# Patient Record
Sex: FEMALE | Race: WHITE
[De-identification: names, ages, dates, MRNs, and addresses within clinical notes are randomized per-mention and may not be internally consistent; named-entity substitution may affect disease eponyms.]

---

## 2020-08-03 ENCOUNTER — HOSPITAL ENCOUNTER (OUTPATIENT)
Dept: HOSPITAL 75 - WSO | Age: 20
Discharge: HOME | End: 2020-08-03
Attending: FAMILY MEDICINE
Payer: MEDICAID

## 2020-08-03 VITALS — SYSTOLIC BLOOD PRESSURE: 132 MMHG | DIASTOLIC BLOOD PRESSURE: 69 MMHG

## 2020-08-03 VITALS — WEIGHT: 201.06 LBS | HEIGHT: 61.81 IN | BODY MASS INDEX: 37 KG/M2

## 2020-08-03 DIAGNOSIS — O23.43: Primary | ICD-10-CM

## 2020-08-03 DIAGNOSIS — Z3A.39: ICD-10-CM

## 2020-08-03 LAB
APTT PPP: YELLOW S
BACTERIA #/AREA URNS HPF: (no result) /HPF
BILIRUB UR QL STRIP: NEGATIVE
FIBRINOGEN PPP-MCNC: (no result) MG/DL
GLUCOSE UR STRIP-MCNC: NEGATIVE MG/DL
KETONES UR QL STRIP: NEGATIVE
LEUKOCYTE ESTERASE UR QL STRIP: (no result)
NITRITE UR QL STRIP: NEGATIVE
PH UR STRIP: 7 [PH] (ref 5–9)
PROT UR QL STRIP: NEGATIVE
RBC #/AREA URNS HPF: (no result) /HPF
SP GR UR STRIP: 1.01 (ref 1.02–1.02)

## 2020-08-03 PROCEDURE — 99213 OFFICE O/P EST LOW 20 MIN: CPT

## 2020-08-03 PROCEDURE — 81000 URINALYSIS NONAUTO W/SCOPE: CPT

## 2020-08-03 PROCEDURE — 87088 URINE BACTERIA CULTURE: CPT

## 2020-08-03 NOTE — NUR
Arrived to unit ambulates self accompanied by mother.  Pt here for c/o contraction pain, 
leaking fluid and nausea.  wt obtained and to room 318.  Gowned and urine sample obtained.  
pt void, gowned and to bed.  plan of care reviewed with pt and mother.

## 2020-08-03 NOTE — NUR
Discharge instructions explained, signed and copy to patient.  pt verbalized understanding 
of instructions and denied questions.

## 2020-08-03 NOTE — DISCHARGE INST-SIMPLE/STANDARD
Discharge Inst-Standard


Reconcile Patient Problems


Problems Reviewed?:  Yes





Discharge Medications


New, Converted or Re-Newed RX:  RX Given to Pt/Family





Patient Instructions/Follow Up


Plan of Care/Instructions/FU:  


UTI in pregnancy: 5 day course of oral antibiotic Keflex, 250mg every 6


hours for 5 days


Activity as Tolerated:  Yes


Goal:  


Drink plenty of water, take tylenol as directed for headaches/pains, and


strict return precautions


Discharge Diet:  No Restrictions


Health Concerns:  


None


[facility]  


Return precautions in pregnancy











PREMA DOS SANTOS MD               Aug 3, 2020 17:33

## 2020-08-03 NOTE — NUR
Discharged to home with belongings in hand.  Ambulates self downstairs to private vehicle 
with belongings in hand.  Accompanied by mother.

## 2020-08-03 NOTE — OB TRIAGE REPORT
PREMA DOS SANTOS MD 8/3/20 1749:


Standard Progress Note


Progress Notes/Assess & Plan


Date Seen by a Provider:  Aug 3, 2020


Time Seen by a Provider:  17:00


Expected Date of Delivery:  Aug 9, 2020


Gestational Age in Weeks:  39


Gestational Age in Days:  1


LMP/KAITLIN Comment:  


KAITLIN by LMP 11/3/2019


Progress/Assessment & Plan





Assessment: 


Pregnancy, 39th week gestation


Positive NG/CT, KAY-


PUPPs


Rubella Equivocal


UTI





Natacha Escamilla is a 21 yo  female who presented to triage with UTI. 

Discharged home with oral antibiotics and strict return precautions. 





Plan:


Keflex 250mg q6h x 5 days


Tylenol 325mg q4h prn pain/headache


Urine sent for culture


Strict return precautions


Keep all OB appts


Plan for elective induction 2020 





HPI:





Natacha is a  at 39w1d were presented to OB triage for a sustained 

contraction that occurred this afternoon in the shower that was so severe she 

had to sit down. She notes it lasted about 4 minutes.  She admits to cramping 

like abdominal pain otherwise; she has not been counting contractions or fetal 

movements. She admits to losing her mucus plug last week, and stating she has 

bloody show with her clinic cervical checks.  She has been 3/0/-3 at her 

previous cervical checks. She states that she has had loss of fluid that is 

thick and yellow in color, small volume that is noticeable when she pees. She 

admits to rib tightness, headache that is intractable, continued pruritus. 

Denies vision changes, shortness of breath, chest pain/pressure, RUQ pain, and 

increased swelling. She has not tried any OTC medications for her headache 

and/or abdominal pain. 





VB-, LOF+ (see above), FM+, CTX+





Objective: 





General: No acute distress


HEENT: No focal deficits


CV: Regular rate, pulses palpable


Lungs: No resp distress


Abd: RUQ nonTTP


Ext: Nonpitting edema in BLE, symmetric





SVE: 350/-2


FHR: 135


Category: I, accels, no decels, prolonged variability


TOCO: 1/10, irritability





UA: 2+ Leukocytes, 20-50 WBC, moderate bacteria





O+, Ab -, HepB -, RE, RPR-, NG/CT+ (KAY -), GBS-, COVID unknown


Final Diagnosis


Final diagnosis: UTI in pregnancy





Diagnosis/Problems


Diagnosis/Problems





(1) UTI in pregnancy


Status:  Acute


Assessment & Plan:  Keflex 250mg q6h x 5days


Qualifiers:  


   Qualified Codes:  O23.43 - Unspecified infection of urinary tract in 

pregnancy, third trimester





SAFIA MCCRAY MD 8/3/20 9657:


Standard Progress Note


Final Diagnosis


Patient was reviewed with me over the phone and agree with above assessment and 

plan. PREMA Lea MD               Aug 3, 2020 17:49


SAFIA MCCRAY MD                Aug 3, 2020 19:07

## 2020-08-11 ENCOUNTER — HOSPITAL ENCOUNTER (INPATIENT)
Dept: HOSPITAL 75 - LDRP | Age: 20
LOS: 2 days | Discharge: HOME | End: 2020-08-13
Attending: FAMILY MEDICINE | Admitting: FAMILY MEDICINE
Payer: MEDICAID

## 2020-08-11 VITALS — DIASTOLIC BLOOD PRESSURE: 78 MMHG | SYSTOLIC BLOOD PRESSURE: 129 MMHG

## 2020-08-11 VITALS — DIASTOLIC BLOOD PRESSURE: 75 MMHG | SYSTOLIC BLOOD PRESSURE: 111 MMHG

## 2020-08-11 VITALS — DIASTOLIC BLOOD PRESSURE: 84 MMHG | SYSTOLIC BLOOD PRESSURE: 138 MMHG

## 2020-08-11 VITALS — SYSTOLIC BLOOD PRESSURE: 90 MMHG | DIASTOLIC BLOOD PRESSURE: 31 MMHG

## 2020-08-11 VITALS — DIASTOLIC BLOOD PRESSURE: 64 MMHG | SYSTOLIC BLOOD PRESSURE: 102 MMHG

## 2020-08-11 VITALS — DIASTOLIC BLOOD PRESSURE: 78 MMHG | SYSTOLIC BLOOD PRESSURE: 132 MMHG

## 2020-08-11 VITALS — WEIGHT: 210.32 LBS | BODY MASS INDEX: 38.22 KG/M2 | HEIGHT: 62.01 IN

## 2020-08-11 VITALS — SYSTOLIC BLOOD PRESSURE: 145 MMHG | DIASTOLIC BLOOD PRESSURE: 75 MMHG

## 2020-08-11 VITALS — DIASTOLIC BLOOD PRESSURE: 88 MMHG | SYSTOLIC BLOOD PRESSURE: 142 MMHG

## 2020-08-11 VITALS — SYSTOLIC BLOOD PRESSURE: 132 MMHG | DIASTOLIC BLOOD PRESSURE: 74 MMHG

## 2020-08-11 VITALS — SYSTOLIC BLOOD PRESSURE: 131 MMHG | DIASTOLIC BLOOD PRESSURE: 70 MMHG

## 2020-08-11 VITALS — DIASTOLIC BLOOD PRESSURE: 79 MMHG | SYSTOLIC BLOOD PRESSURE: 136 MMHG

## 2020-08-11 VITALS — SYSTOLIC BLOOD PRESSURE: 132 MMHG | DIASTOLIC BLOOD PRESSURE: 79 MMHG

## 2020-08-11 VITALS — DIASTOLIC BLOOD PRESSURE: 82 MMHG | SYSTOLIC BLOOD PRESSURE: 139 MMHG

## 2020-08-11 VITALS — DIASTOLIC BLOOD PRESSURE: 69 MMHG | SYSTOLIC BLOOD PRESSURE: 122 MMHG

## 2020-08-11 VITALS — SYSTOLIC BLOOD PRESSURE: 127 MMHG | DIASTOLIC BLOOD PRESSURE: 75 MMHG

## 2020-08-11 VITALS — SYSTOLIC BLOOD PRESSURE: 117 MMHG | DIASTOLIC BLOOD PRESSURE: 73 MMHG

## 2020-08-11 VITALS — DIASTOLIC BLOOD PRESSURE: 79 MMHG | SYSTOLIC BLOOD PRESSURE: 108 MMHG

## 2020-08-11 VITALS — DIASTOLIC BLOOD PRESSURE: 69 MMHG | SYSTOLIC BLOOD PRESSURE: 111 MMHG

## 2020-08-11 VITALS — SYSTOLIC BLOOD PRESSURE: 129 MMHG | DIASTOLIC BLOOD PRESSURE: 63 MMHG

## 2020-08-11 VITALS — DIASTOLIC BLOOD PRESSURE: 82 MMHG | SYSTOLIC BLOOD PRESSURE: 135 MMHG

## 2020-08-11 VITALS — DIASTOLIC BLOOD PRESSURE: 77 MMHG | SYSTOLIC BLOOD PRESSURE: 135 MMHG

## 2020-08-11 VITALS — SYSTOLIC BLOOD PRESSURE: 139 MMHG | DIASTOLIC BLOOD PRESSURE: 71 MMHG

## 2020-08-11 VITALS — DIASTOLIC BLOOD PRESSURE: 63 MMHG | SYSTOLIC BLOOD PRESSURE: 134 MMHG

## 2020-08-11 VITALS — DIASTOLIC BLOOD PRESSURE: 83 MMHG | SYSTOLIC BLOOD PRESSURE: 145 MMHG

## 2020-08-11 VITALS — DIASTOLIC BLOOD PRESSURE: 59 MMHG | SYSTOLIC BLOOD PRESSURE: 107 MMHG

## 2020-08-11 VITALS — DIASTOLIC BLOOD PRESSURE: 85 MMHG | SYSTOLIC BLOOD PRESSURE: 135 MMHG

## 2020-08-11 VITALS — SYSTOLIC BLOOD PRESSURE: 115 MMHG | DIASTOLIC BLOOD PRESSURE: 64 MMHG

## 2020-08-11 VITALS — SYSTOLIC BLOOD PRESSURE: 119 MMHG | DIASTOLIC BLOOD PRESSURE: 76 MMHG

## 2020-08-11 VITALS — SYSTOLIC BLOOD PRESSURE: 133 MMHG | DIASTOLIC BLOOD PRESSURE: 88 MMHG

## 2020-08-11 VITALS — DIASTOLIC BLOOD PRESSURE: 80 MMHG | SYSTOLIC BLOOD PRESSURE: 133 MMHG

## 2020-08-11 VITALS — SYSTOLIC BLOOD PRESSURE: 109 MMHG | DIASTOLIC BLOOD PRESSURE: 78 MMHG

## 2020-08-11 VITALS — SYSTOLIC BLOOD PRESSURE: 135 MMHG | DIASTOLIC BLOOD PRESSURE: 81 MMHG

## 2020-08-11 VITALS — DIASTOLIC BLOOD PRESSURE: 57 MMHG | SYSTOLIC BLOOD PRESSURE: 100 MMHG

## 2020-08-11 VITALS — DIASTOLIC BLOOD PRESSURE: 76 MMHG | SYSTOLIC BLOOD PRESSURE: 107 MMHG

## 2020-08-11 VITALS — SYSTOLIC BLOOD PRESSURE: 121 MMHG | DIASTOLIC BLOOD PRESSURE: 69 MMHG

## 2020-08-11 VITALS — DIASTOLIC BLOOD PRESSURE: 67 MMHG | SYSTOLIC BLOOD PRESSURE: 119 MMHG

## 2020-08-11 VITALS — SYSTOLIC BLOOD PRESSURE: 135 MMHG | DIASTOLIC BLOOD PRESSURE: 71 MMHG

## 2020-08-11 VITALS — DIASTOLIC BLOOD PRESSURE: 79 MMHG | SYSTOLIC BLOOD PRESSURE: 139 MMHG

## 2020-08-11 VITALS — SYSTOLIC BLOOD PRESSURE: 100 MMHG | DIASTOLIC BLOOD PRESSURE: 53 MMHG

## 2020-08-11 VITALS — DIASTOLIC BLOOD PRESSURE: 57 MMHG | SYSTOLIC BLOOD PRESSURE: 99 MMHG

## 2020-08-11 VITALS — DIASTOLIC BLOOD PRESSURE: 77 MMHG | SYSTOLIC BLOOD PRESSURE: 121 MMHG

## 2020-08-11 VITALS — SYSTOLIC BLOOD PRESSURE: 120 MMHG | DIASTOLIC BLOOD PRESSURE: 77 MMHG

## 2020-08-11 VITALS — SYSTOLIC BLOOD PRESSURE: 127 MMHG | DIASTOLIC BLOOD PRESSURE: 70 MMHG

## 2020-08-11 VITALS — SYSTOLIC BLOOD PRESSURE: 136 MMHG | DIASTOLIC BLOOD PRESSURE: 80 MMHG

## 2020-08-11 VITALS — SYSTOLIC BLOOD PRESSURE: 125 MMHG | DIASTOLIC BLOOD PRESSURE: 77 MMHG

## 2020-08-11 VITALS — SYSTOLIC BLOOD PRESSURE: 98 MMHG | DIASTOLIC BLOOD PRESSURE: 55 MMHG

## 2020-08-11 VITALS — DIASTOLIC BLOOD PRESSURE: 85 MMHG | SYSTOLIC BLOOD PRESSURE: 122 MMHG

## 2020-08-11 VITALS — SYSTOLIC BLOOD PRESSURE: 122 MMHG | DIASTOLIC BLOOD PRESSURE: 75 MMHG

## 2020-08-11 VITALS — SYSTOLIC BLOOD PRESSURE: 120 MMHG | DIASTOLIC BLOOD PRESSURE: 76 MMHG

## 2020-08-11 VITALS — SYSTOLIC BLOOD PRESSURE: 111 MMHG | DIASTOLIC BLOOD PRESSURE: 72 MMHG

## 2020-08-11 VITALS — SYSTOLIC BLOOD PRESSURE: 141 MMHG | DIASTOLIC BLOOD PRESSURE: 71 MMHG

## 2020-08-11 VITALS — SYSTOLIC BLOOD PRESSURE: 126 MMHG | DIASTOLIC BLOOD PRESSURE: 84 MMHG

## 2020-08-11 VITALS — SYSTOLIC BLOOD PRESSURE: 126 MMHG | DIASTOLIC BLOOD PRESSURE: 74 MMHG

## 2020-08-11 VITALS — DIASTOLIC BLOOD PRESSURE: 87 MMHG | SYSTOLIC BLOOD PRESSURE: 137 MMHG

## 2020-08-11 VITALS — SYSTOLIC BLOOD PRESSURE: 132 MMHG | DIASTOLIC BLOOD PRESSURE: 78 MMHG

## 2020-08-11 VITALS — SYSTOLIC BLOOD PRESSURE: 101 MMHG | DIASTOLIC BLOOD PRESSURE: 55 MMHG

## 2020-08-11 VITALS — SYSTOLIC BLOOD PRESSURE: 127 MMHG | DIASTOLIC BLOOD PRESSURE: 78 MMHG

## 2020-08-11 VITALS — SYSTOLIC BLOOD PRESSURE: 100 MMHG | DIASTOLIC BLOOD PRESSURE: 61 MMHG

## 2020-08-11 VITALS — SYSTOLIC BLOOD PRESSURE: 110 MMHG | DIASTOLIC BLOOD PRESSURE: 72 MMHG

## 2020-08-11 VITALS — DIASTOLIC BLOOD PRESSURE: 64 MMHG | SYSTOLIC BLOOD PRESSURE: 113 MMHG

## 2020-08-11 DIAGNOSIS — O99.613: ICD-10-CM

## 2020-08-11 DIAGNOSIS — K21.9: ICD-10-CM

## 2020-08-11 DIAGNOSIS — Z23: ICD-10-CM

## 2020-08-11 DIAGNOSIS — Z3A.40: ICD-10-CM

## 2020-08-11 DIAGNOSIS — D62: ICD-10-CM

## 2020-08-11 DIAGNOSIS — Z87.891: ICD-10-CM

## 2020-08-11 DIAGNOSIS — O48.0: Primary | ICD-10-CM

## 2020-08-11 LAB
BASOPHILS # BLD AUTO: 0 10^3/UL (ref 0–0.1)
BASOPHILS NFR BLD AUTO: 0 % (ref 0–10)
EOSINOPHIL # BLD AUTO: 0.3 10^3/UL (ref 0–0.3)
EOSINOPHIL NFR BLD AUTO: 3 % (ref 0–10)
ERYTHROCYTE [DISTWIDTH] IN BLOOD BY AUTOMATED COUNT: 14.1 % (ref 10–14.5)
HCT VFR BLD CALC: 34 % (ref 35–52)
HGB BLD-MCNC: 11.7 G/DL (ref 11.5–16)
LYMPHOCYTES # BLD AUTO: 2.5 X 10^3 (ref 1–4)
LYMPHOCYTES NFR BLD AUTO: 23 % (ref 12–44)
MANUAL DIFFERENTIAL PERFORMED BLD QL: NO
MCH RBC QN AUTO: 29 PG (ref 25–34)
MCHC RBC AUTO-ENTMCNC: 34 G/DL (ref 32–36)
MCV RBC AUTO: 85 FL (ref 80–99)
MONOCYTES # BLD AUTO: 1 X 10^3 (ref 0–1)
MONOCYTES NFR BLD AUTO: 9 % (ref 0–12)
NEUTROPHILS # BLD AUTO: 7.1 X 10^3 (ref 1.8–7.8)
NEUTROPHILS NFR BLD AUTO: 65 % (ref 42–75)
PLATELET # BLD: 210 10^3/UL (ref 130–400)
PMV BLD AUTO: 11.3 FL (ref 7.4–10.4)
WBC # BLD AUTO: 10.9 10^3/UL (ref 4.3–11)

## 2020-08-11 PROCEDURE — 85025 COMPLETE CBC W/AUTO DIFF WBC: CPT

## 2020-08-11 PROCEDURE — 86850 RBC ANTIBODY SCREEN: CPT

## 2020-08-11 PROCEDURE — 86900 BLOOD TYPING SEROLOGIC ABO: CPT

## 2020-08-11 PROCEDURE — 3E033VJ INTRODUCTION OF OTHER HORMONE INTO PERIPHERAL VEIN, PERCUTANEOUS APPROACH: ICD-10-PCS | Performed by: OBSTETRICS & GYNECOLOGY

## 2020-08-11 PROCEDURE — 86901 BLOOD TYPING SEROLOGIC RH(D): CPT

## 2020-08-11 PROCEDURE — 90707 MMR VACCINE SC: CPT

## 2020-08-11 PROCEDURE — 36415 COLL VENOUS BLD VENIPUNCTURE: CPT

## 2020-08-11 RX ADMIN — SODIUM CHLORIDE, SODIUM LACTATE, POTASSIUM CHLORIDE, CALCIUM CHLORIDE, AND DEXTROSE MONOHYDRATE SCH MLS/HR: 600; 310; 30; 20; 5 INJECTION, SOLUTION INTRAVENOUS at 06:57

## 2020-08-11 RX ADMIN — Medication SCH ML: at 07:39

## 2020-08-11 RX ADMIN — Medication SCH ML/HR: at 19:17

## 2020-08-11 RX ADMIN — KETOROLAC TROMETHAMINE SCH MG: 30 INJECTION, SOLUTION INTRAMUSCULAR; INTRAVENOUS at 22:11

## 2020-08-11 RX ADMIN — Medication SCH ML/HR: at 11:35

## 2020-08-11 RX ADMIN — Medication SCH ML: at 15:04

## 2020-08-11 RX ADMIN — SODIUM CHLORIDE, SODIUM LACTATE, POTASSIUM CHLORIDE, CALCIUM CHLORIDE, AND DEXTROSE MONOHYDRATE SCH MLS/HR: 600; 310; 30; 20; 5 INJECTION, SOLUTION INTRAVENOUS at 14:12

## 2020-08-11 RX ADMIN — Medication SCH ML: at 22:20

## 2020-08-11 NOTE — NUR
Dr. Hernandez here for epidural placement. Procedure explained, consent reviewed and 
signed by anesthesia.  Questions answered to patient's satisfaction. Time out taken to 
verify correct patient/procedure. Patient up to side of bed, assisted into sitting position. 
 Betadine prep done x3 and sterile drape applied.  Local done, see anesthesia record.  Test 
dose given, see anesthesia record for drug and dosage.  Epidural catheter secured in place.  
Epidural placement complete.  Assisted back into bed, monitors adjusted.  Epidural dosed, 
see anesthesia record.  Epidural of Fentanyl/Bupivicaine @ 12 cc/hr stated per pump.  
Patient tolerated procedure well.

## 2020-08-11 NOTE — XMS REPORT
Continuity of Care Document

                             Created on: 2020



Natacha Escamilla

External Reference #: [id number]

: 2000

Sex: Female



Demographics





                          Address                   [address] [place]  [address]

 

                          Home Phone                [phone] x

 

                          Preferred Language        Unknown

 

                          Marital Status            Unknown

 

                          Protestant Affiliation     Unknown

 

                          Race                      Unknown

 

                          Ethnic Group              Unknown





Author





                          Organization              Unknown

 

                          Address                   Unknown

 

                          Phone                     Unavailable



              



Allergies

      



             Active           Description           Code           Type         

  Severity   

                Reaction           Onset           Reported/Identified          

 

Relationship to Patient                 Clinical Status        

 

                Yes             No Known Drug Allergies           [id number]    

       Drug 

Allergy           Unknown           N/A                             2020  

      

                                                             



                  



Medications

      



There is no data.                  



Problems

      



             Date Dx Coded           Attending           Type           Code    

       

Diagnosis                               Diagnosed By        

 

             2014           TWYLA ANDERSON                        311  

         

DEPRESSIVE DISORDER NOT ELSEWHERE CLASSIFIED                    

 

             2020           SHAZIA SANTIAGO, SAFIA PORTILLO Ot           O23.4

3           

UNSP INFCT OF URINARY TRACT IN PREGNANCY                    

 

             2020           SAFIA MCCRAY MD, Ot           Z3A.3

9           

39 WEEKS GESTATION OF PREGNANCY                    



                      



Procedures

      



                Code            Description           Performed By           Per

formed On        

 

                                      87999                                 PSYC

H DIAGNOSTIC EVALUATION   

                                                    2014        



                  



Results

      



                    Test                Result              Range        

 

                                        CULTURE, URINE - 20 19:00         

 

                    CULTURE, URINE, ROUTINE           SEE NOTE            NRG   

     

 

                                        CULTURE, GENITAL - 20 19:00       

  

 

                    CULTURE, GENITAL           SEE NOTE            NRG        

 

                                        RUBELLA IMMUNE STATUS - 20 13:27  

       

 

                    RUBELLA ANTIBODY (IGG)           0.95 index           NRG   

     

 

                                        CULTURE, GENITAL - 20 16:46       

  

 

                    CULTURE, GENITAL           SEE NOTE            NRG        

 

                                        PENTA SCREEN - 20 10:28         

 

                    Maternal Weight           170 lbs             NRG        

 

                    Est'd Date of Delivery           2020            NRG  

      

 

                    KAITLIN Determined by           LMP                 NRG        

 

                    Mother's Ethnic Origin                       NRG    

    

 

                    Number of Fetuses           1                   NRG        

 

                    Insulin Depend Diabetic           NO                  NRG   

     

 

                    Repeat Specimen           NO                  NRG        

 

                    Hx Of Neural Tube Defects           NO                  NRG 

       

 

                    Prev Pregnancy Down Synd           NO                  NRG  

      

 

                    Donor Egg           NO                  NRG        

 

                    Donor Age: Egg Retrieval           NOT GIVEN            NRG 

       

 

                    Cigarette smoker           NOT GIVEN            NRG        

 

                    INTERPRETATION:           SEE NOTE            NRG        

 

                    Risk for ONTD           <1:5000             NRG        

 

                    Age Risk Down Syndrome           1:1178              NRG    

    

 

                    JUAN Down Syndrome Risk           <1:5000             <1:270 

       

 

                    JUAN Trisomy 18 Risk           <1:5000             <1:100    

    

 

                    Calc'd Gestational Age           20.4                NRG    

    

 

                    AFP, Serum           44.2 ng/mL           NRG        

 

                    AFP MoM             0.81                NRG        

 

                    hCG, Serum           7.0 IU/mL           NRG        

 

                    hCG MoM             0.39                NRG        

 

                    Estriol, Free           1.74 ng/mL           NRG        

 

                    Estriol MoM           0.86                NRG        

 

                    Inhibin A, Dimeric           70 pg/mL            NRG        

 

                    Inhibin A MoM           0.43                NRG        

 

                    h-hCG, Serum           5.7 mcg/L           NRG        

 

                    h-hCG MoM           0.56                NRG        

 

                    Date of Birth           2000            NRG        

 

                    Collection Date           2020            NRG        

 

                                        GLUCOSE JEREMY 1 HOUR - 20 09:48     

    

 

                    GLUCOSE, POSTPRANDIAL/ 1 HOUR           131 mg/dL           

See Note:        

 

                                        CBC - 20 09:48         

 

                    WHITE BLOOD CELL COUNT           11.3 Thousand/uL           

3.8-10.8        

 

                    RED BLOOD CELL COUNT           4.03 Million/uL           3.8

0-5.10        

 

                    HEMOGLOBIN           12.0 g/dL           11.7-15.5        

 

                    HEMATOCRIT           36.1 %              35.0-45.0        

 

                    MCV                 89.6 fL             80.0-100.0        

 

                    MCH                 29.8 pg             27.0-33.0        

 

                    MCHC                33.2 g/dL           32.0-36.0        

 

                    RDW                 12.5 %              11.0-15.0        

 

                    PLATELET COUNT           228 Thousand/uL           140-400  

      

 

                    MPV                 11.2 fL             7.5-12.5        

 

                    ABSOLUTE NEUTROPHILS           8407 cells/uL           1500-

7800        

 

                    ABSOLUTE LYMPHOCYTES           2305 cells/uL           850-3

900        

 

                    ABSOLUTE MONOCYTES           475 cells/uL           200-950 

       

 

                    ABSOLUTE EOSINOPHILS           79 cells/uL            

       

 

                    ABSOLUTE BASOPHILS           34 cells/uL           0-200    

    

 

                    NEUTROPHILS           74.4 %              NRG        

 

                    LYMPHOCYTES           20.4 %              NRG        

 

                    MONOCYTES           4.2 %               NRG        

 

                    EOSINOPHILS           0.7 %               NRG        

 

                    BASOPHILS           0.3 %               NRG        

 

                                        SYPHILIS (RPR W/ REFLEX CONFIRMATION) - 

20 09:48         

 

                          RPR (DX) W/REFL TITER AND CONFIRMATORY TESTING        

   NON-REACTIVE           

                                        NON-REACTIVE        

 

                                        GLUCOSE JEREMY 3 HOUR - 20 13:49     

    

 

                    TIME 1              1038                NRG        

 

                    SPECIMEN 1           76 mg/dL            65-99        

 

                    TIME 2              1145                NRG        

 

                    SPECIMEN 2           135 mg/dL           NRG        

 

                    TIME 3              1245                NRG        

 

                    SPECIMEN 3           145 mg/dL           NRG        

 

                    TIME 4              1345                NRG        

 

                    SPECIMEN 4           135 mg/dL           NRG        

 

                    COMMENT                                 NRG        

 

                                        GLUCOSE JEREMY 3 HOUR - 20 14:21     

    

 

                    TIME 1              FASTING             NRG        

 

                    SPECIMEN 1           81 mg/dL            65-99        

 

                    TIME 2              1 HOUR              NRG        

 

                    SPECIMEN 2           150 mg/dL           NRG        

 

                    TIME 3              2 HOURS             NRG        

 

                    SPECIMEN 3           113 mg/dL           NRG        

 

                    TIME 4              3 HOURS             NRG        

 

                    SPECIMEN 4           120 mg/dL           NRG        

 

                    COMMENT                                 NRG        

 

                                        BILE ACIDS, FRACTIONATED LCMS - 20

 10:42         

 

                    CHOLIC ACID           0.6 umol/L           < OR = 1.8       

 

 

                    DEOXYCHOLIC ACID           <0.5 umol/L           < OR = 2.4 

       

 

                    CHENODEOXYCHOLIC ACID           <0.5 umol/L           < OR =

 3.1        

 

                    TOTAL BILE ACIDS           <1.5 umol/L           < OR = 6.8 

       

 

                                        CULTURE, GROUP B STREP (VAGINAL) - 07/15

/20 10:42         

 

                    STREPTOCOCCUS, GROUP B CULTURE           SEE NOTE           

 NRG        

 

                                        Complete urinalysis with reflex to cultu

re - 20 16:30         

 

                    Urine color determination           YELLOW              NRG 

       

 

                    Urine clarity determination           SL CLOUDY            N

RG        

 

                    Urine pH measurement by test strip           7.0            

     5-9        

 

                    Specific gravity of urine by test strip           1.010     

          1.016-1.022  

     

 

                          Urine protein assay by test strip, semi-quantitative  

         NEGATIVE         

                                        NEGATIVE        

 

                    Urine glucose detection by automated test strip           NE

GATIVE            

NEGATIVE        

 

                          Erythrocytes detection in urine sediment by light micr

oscopy           NEGATIVE 

                                        NEGATIVE        

 

                    Urine ketones detection by automated test strip           NE

GATIVE            

NEGATIVE        

 

                    Urine nitrite detection by test strip           NEGATIVE    

        NEGATIVE    

   

 

                    Urine total bilirubin detection by test strip           NEGA

TIVE            

NEGATIVE        

 

                          Urine urobilinogen measurement by automated test strip

 (mass/volume)           

0.2 mg/dL                               < = 1.0        

 

                    Urine leukocyte esterase detection by dipstick           2+ 

                 NEGATIVE 

      

 

                                        Automated urine sediment erythrocyte cou

nt by microscopy (number/high power 

field)                    NONE                      NRG        

 

                                        Automated urine sediment leukocyte count

 by microscopy (number/high power field)

                           [HPF]                    NRG        

 

                          Bacteria detection in urine sediment by light microsco

py           MODERATE     

                                        NRG        

 

                                        Squamous epithelial cells detection in u

rine sediment by light microscopy       

                          10-25                     NRG        

 

                          Crystals detection in urine sediment by light microsco

py           NONE         

                                        NRG        

 

                    Casts detection in urine sediment by light microscopy       

    NONE                

NRG        

 

                          Mucus detection in urine sediment by light microscopy 

          NEGATIVE        

                                        NRG        

 

                    Complete urinalysis with reflex to culture           YES    

             NRG        

 

                                        Bacterial urine culture - 20 16:30

         

 

                    Bacterial urine culture           3 OR MORE            NRG  

      

 

                    COLONY COUNT           >100,000/ML            NRG        

 

                    SUSCEPTIBILITY           GRAM POSITIVES, SUGGESTING PROBABLE

            NRG     

   

 

                    MRSA SCREEN           COLLECTION CONTAMINATION WITH SKIN ANNAMARIE

RA            NRG   

     

 

                    RAPID ID            NO SUSCEPTIBILITY PERFORMED            N

RG        



                                          



Encounters

      



                ACCT No.           Visit Date/Time           Discharge          

 Status         

             Pt. Type           Provider           Facility           Loc./Unit 

          

Complaint        

 

                [id number]           2020 15:15:00           2020 23:59:

59           CLS

                Outpatient           JAIMIE SANTIAGO, LESLI TOWNSEND                        

   [facility]

 [facility]                                  

 

             [id number]           07/15/2020 09:00:00                              

       Document

 Registration                                                                   

 

 

             [id number]           2020 09:20:00                              

       Document

 Registration                                                                   

 

 

             [id number]           2020 10:40:00                              

       Document

 Registration                                                                   

 

 

             [id number]           2020 10:20:00                              

       Document

 Registration                                                                   

 

 

             [id number]           2020 08:20:00                              

       Document

 Registration                                                                   

 

 

             [id number]           2020 13:00:00                              

       Document

 Registration                                                                   

 

 

             [id number]           2020 14:20:00                              

       Document

 Registration                                                                   

 

 

             [id number]           2020 13:20:00                              

       Document

 Registration                                                                   

 

 

             [id number]           2020 14:00:00                              

       Document

 Registration                                                                   

 

 

                [id number]           2014 13:12:00           2014 23:59:5

9           CLS 

                Outpatient           TWYLA ANDERSON                          

          

                                                 

 

                    [id number]           2020 16:11:00           

020 17:50:00        

                DIS             Outpatient           SHAZIA SANTIAGO, SAFIA Pollock [facility]           [facility]                       LEAKING,CONTRACTIONS   

     

 

             [id number]           2020 06:00:00                        P

NARCISO           

Preadike ETIENNE MD, LESLI IRIZARRY

## 2020-08-11 NOTE — LABOR PROGRESS NOTE
Labor Progress Note


Labor Progress Note


Date Seen by Provider:  Aug 11, 2020


Time Seen by Provider:  20:15


Subjective:


Pt denies complaints.





Objective:





Cervical exam: 80/-2


Consistency: soft


Position: anterior


Presentation: vertex


Fetal heart tones: 180 beats per minute, moderate variability


Tocometer: 5 ctx/10 minutes





Assessment/Plan:


Natacha Escamilla  is a 20  /Para  2 / 0,Gestational Age 40w2d here for IOL.


FSE/TOCO


Anesthesia: epidural


No cervical change in 4 hours in spite of adequate contractions with pitocin and

SROM this morning, increasing fetal caput without gain of station, suspect 

OP/CPD, now with fetal tachycardia, discussed with patient and recommend 

proceeding to . She is in agreement. Discussed with Dr. Drew on call 

for Ob/Gyn who is also in agreement.





Vitals - Labs


Vital Signs - I&O





Vital Signs








  Date Time  Temp Pulse Resp B/P (MAP) Pulse Ox O2 Delivery O2 Flow Rate FiO2


 


20 20:00  87 18 111/75 (87) 99   


 


20 19:40  96 18 129/63 (85) 99   


 


20 19:20 36.5 97 20 132/74 (93) 98   


 


20 19:00  97 18 120/76 (91) 99 Room Air  


 


20 18:45  97 18 132/78 (96) 99 Room Air  


 


20 18:30 36.3 97 18 132/78 (96) 98 Room Air  


 


20 18:15  106 18 107/59 (75) 98 Room Air  


 


20 18:00  101 18 98/55 (69) 99 Room Air  


 


20 17:45  100 18 100/53 (69) 99 Room Air  


 


20 17:30 36.6 95 18 110/72 (85) 97 Room Air  


 


20 17:15  86 18 100/57 (71) 96 Room Air  


 


20 17:00  86 18 99/57 (71) 97 Room Air  


 


20 16:45  78 18 101/55 (70) 97 Room Air  


 


20 16:30 36.4 86 18 115/64 (81) 96 Room Air  


 


20 16:15  86 18 111/69 (83) 97 Room Air  


 


20 16:00 36.1 83 18 111/72 (85) 97 Room Air  


 


20 15:45  96 18 122/85 (97) 98 Room Air  


 


20 15:30 35.7 100 18 117/73 (88) 98 Room Air  


 


20 15:15  96 18 125/77 (93) 97 Room Air  


 


20 15:00  93 18 127/70 (89) 98 Room Air  


 


20 14:45  95 18 121/69 (86) 97 Room Air  


 


20 14:30  99 18 122/69 (86) 98 Room Air  


 


20 14:15  127 18 135/71 (92) 98 Room Air  


 


20 14:00  100 18 131/70 (90) 98 Room Air  


 


20 13:45  102 18 134/63 (86) 98 Room Air  


 


20 13:30  98 18 133/88 (103) 98 Room Air  


 


20 13:15 36.0 94 18 122/75 (91) 98 Room Air  


 


20 13:00  83 18 126/74 (91) 98 Room Air  


 


20 12:45  87 18 129/78 (95) 98 Room Air  


 


20 12:30 36.3 93 18 135/77 (96) 98 Room Air  


 


20 12:15  85 18 132/74 (93) 97 Room Air  


 


20 12:00  85 18 141/71 (94) 97 Room Air  


 


20 11:45  107 18 145/75 (98) 97 Room Air  


 


20 11:33  97 18 139/71 (93) 97 Room Air  


 


20 11:30  97 18 138/84 (102) 98 Room Air  


 


20 11:27  94 18 137/87 (104) 97 Room Air  


 


20 11:26  104 18 136/80 (98) 98 Room Air  


 


20 11:18  100 18 142/88 (106) 98 Room Air  


 


20 11:16  100 18 136/79 (98) 98 Room Air  


 


20 11:15  101 18 136/79 (98)  Room Air  


 


20 11:00  95 18 135/82 (99)  Room Air  


 


20 10:45 36.3 93 18 126/84 (98)  Room Air  


 


20 10:30  79 18 119/67 (84)  Room Air  


 


20 10:15  84 18 139/82 (101)  Room Air  


 


20 10:00  88 18 132/79 (96)  Room Air  


 


20 09:45  83 18 127/75 (92)  Room Air  


 


20 09:30  83 18 139/79 (99)  Room Air  


 


20 09:15   18   Room Air  


 


20 09:00   18   Room Air  


 


20 08:45  90 18 119/76 (90)  Room Air  


 


20 08:30  90 18 120/77 (91)  Room Air  


 


20 08:15  98 18 135/85 (102)  Room Air  


 


20 08:00  83 18 127/78 (94)  Room Air  


 


20 07:45  82 18 133/80 (97)  Room Air  


 


20 07:10 36.3       


 


20 06:21 36.0 94 18  97 Room Air  











Labs


Laboratory Tests


20 07:44: 


White Blood Count 10.9, Red Blood Count 4.03L, Hemoglobin 11.7, Hematocrit 34L, 

Mean Corpuscular Volume 85, Mean Corpuscular Hemoglobin 29, Mean Corpuscular 

Hemoglobin Concent 34, Red Cell Distribution Width 14.1, Platelet Count 210, 

Mean Platelet Volume 11.3H, Neutrophils (%) (Auto) 65, Lymphocytes (%) (Auto) 

23, Monocytes (%) (Auto) 9, Eosinophils (%) (Auto) 3, Basophils (%) (Auto) 0, 

Neutrophils # (Auto) 7.1, Lymphocytes # (Auto) 2.5, Monocytes # (Auto) 1.0, 

Eosinophils # (Auto) 0.3, Basophils # (Auto) 0.0











LESLI ETIENNE MD             Aug 11, 2020 20:31

## 2020-08-11 NOTE — NUR
TONY STOVER presented to unit via ambulation  from home/ED, accompanied by ed tech & SO, 
for INDUCTION. TONY STOVER weighed, gowned, voided, and to bed.  EFHM and TOCO applied, 
VS taken.  TONY STOVER oriented to bed controls, call light, TV, heat, and A/C controls.

## 2020-08-11 NOTE — HISTORY & PHYSICAL-OB
PREMA HUNTER MD 20 0737:


OB - Chief Complaint & HPI


Date/Time


Date of Admission:


Date of Admission:  Aug 11, 2020 at 05:31


Date seen by a Provider:  Aug 11, 2020


Time Seen by a Provider:  07:15





Chief Complaint/History


OB-Reason for Admission/Chief:  Induction of Labor


Hx :  2


Hx Para:  0


Hx Last Menstrual Period:  11/3/2019


Expected Date of Delivery:  Aug 9, 2020


Gestational Age in Weeks:  40


Gestational Age in Days:  2


Indication for induction:  post dates


Admission Nurse Assessment Rev:  Yes





Allergies and Home Medications


Allergies


Coded Allergies:  


     No Known Drug Allergies (Unverified , 20)





Home Medications


Acetaminophen 325 Mg Capsule, 325 MG PO Q4H PRN for PAIN-MILD (1-4)


   Prescribed by: PREMA HUNTER on 8/3/20 1728


Mgd275/FA/Omega3/Dha/Fish Oil 1 Each Tab.chew, 2 EACH PO DAILY, (Reported)





Patient Home Medication List


Home Medication List Reviewed:  Yes





OB - History


Hx of Present Pregnancy


Prenatal Care:  Yes


Ultrasounds:  Normal mid trimester US


Obstetrical Complications:  Other (PUPPS)


Medical Complications:  None





Prenatal Information


Pregnancy Induced Hypertension:  No


Maternal Gestational Diabetes:  No


Postpartum Hemorrhage:  No





Obstetrical History


Hx :  2


Hx Para:  0


Hx # Term Pregnancies:  0


Hx #  Pregnancies:  0


Number of Living Children:  0


Hx Total # of Abortions (Spona:  1


Hx Multiple Gestation:  No


Hx Ectopic Pregnancy:  No


Hx Stillbirth:  No


Hx Pregnancy Complication:  No


Hx Pregnancy Induced Hypertens:  No


Hx Maternal Gestational Diabet:  No


Hx Postpartum Hemorrhage:  No





Patient Past Medical History





No significant past medical history





Social History/Family History


HIV/AIDS:  No


Recent Infectious Disease Expo:  No


Sexually Transmitted Disease:  Yes (Positive NG/CT, neg KAY)


Recreational Drug Use:  No


Smoking Cessation:  Former smoker





Immunizations


Hepatitis A:  Yes


Hepatitis B:  Yes


Rubella:  not immune (Equivocal)


RPR/VDRL:  Negative


GBS Status:  Negative


HBsAG:  Negative





OB - Admission Exam


Physical Exam


Vitals:





Vital Signs








 20





 06:21


 


Temp 36.0


 


Pulse 94


 


Resp 18


 


Pulse Ox 97


 


O2 Delivery Room Air








HEENT:  EOMI


Heart:  Rhythm Normal


Lungs:  Clear (Non-labored respirations)


Abdomen:  Gravid


Extremities:  Normal


Reflexes:  Normal


Cervical Dilatation:  3cm


Effacement:  50%


Station:  -2


Membranes:  Intact


Fetal Heart Rate:  130's


Accelerations:  Accelerations Present


Decelerations:  No Decelerations


Long Term Variability:  Average (6-25)


Contractions on Admission:  None





Gusman Scoring Tool (Modified)


Dilation (cm):  3-4cm (2)


Effacement (%):  51-79%  (2)


Fetal Descent/Station:  -2        (1)


Cervix Consistency:  Medium(1)


Cervix Position:  Middle/Mid-Position  (1)


Subtract 1 point for:  Nulliparity (-1)


Gusman Score:  6





OB - Assessment/Plan/Diagnosis


Assessment


Assessment:  induction of labor


Admission Dx


Elective induction of labor


40 week gestation of pregnancy


Admission Status:  Inpatient Order (span 2 midnights)


Reason for Inpatient Admission:  


Elective induction of labor





Plan


Plan:  Induction


Induction Method:  per Pitocin Protocol


Other Plan


- Induction of labor per Pitocin protocol


- Patient Rubella equivocal, will need MMR


Problems:  


(1) 40 weeks gestation of pregnancy


Assessment & Plan:  - O+. Ab-, RPR-, HIV-, RE, NG/CT+ (-KAY), GBS-


- TOCO/CFM


- IVFs


- CLD





(2) Encounter for induction of labor


Assessment & Plan:  - Pitocin protocol


- Anesthesia: plan for Epidural


- Pepcid for GERD





(3) Rubella non-immune status, antepartum


Assessment & Plan:  - Will need MMR post-partum








LESLI ETIENNE MD 20 1009:


Allergies and Home Medications


Allergies


Coded Allergies:  


     No Known Drug Allergies (Unverified , 20)





Home Medications


Acetaminophen 325 Mg Capsule, 325 MG PO Q4H PRN for PAIN-MILD (1-4)


   Prescribed by: PREMA HUNTER on 8/3/20 1728


Gzi369/FA/Omega3/Dha/Fish Oil 1 Each Tab.chew, 2 EACH PO DAILY, (Reported)





Supervisory-Addendum Brief


Supervisory Addendum


I personally have seen and evaluated the patient. I agree with the documentation

by PGY-2 Dr. Hunter.











PREMA HUNTER MD              Aug 11, 2020 07:37


LESLI ETIENNE MD             Aug 11, 2020 10:09

## 2020-08-11 NOTE — CONSULTATION
History of Present Illness


History of Present Illness


Patient Consulted On(simin/time)


20


 21:46


Date Seen by Provider:  Aug 11, 2020


Time Seen by Provider:  21:46


Reason for Visit:  Induction of labor


History of Present Illness


Patient admitted for labor induction post dates.  AROM, Pitocin used and 

progressed to 6 cm, and made no change for 4 hrs when I was contacted about per

sistent fetal tachycardia





Allergies and Home Medications


Allergies


Coded Allergies:  


     No Known Drug Allergies (Unverified , 20)





Home Medications


Acetaminophen 325 Mg Capsule, 325 MG PO Q4H PRN for PAIN-MILD (1-4)


   Prescribed by: GURPREETIA RAYA on 8/3/20 1728


Ktq900/FA/Omega3/Dha/Fish Oil 1 Each Tab.chew, 2 EACH PO DAILY, (Reported)





Patient Home Medication List


Home Medication List Reviewed:  Yes





Past Medical-Social-Family Hx


Patient Social History


Alcohol Use:  Denies Use


Recreational Drug Use:  No


Smoking Status:  Former Smoker


Type Used:  Cigarettes


Former Smoker, Quit:  Dec 31, 2019


Recent Foreign Travel:  No


Contact w/Someone Who Travel:  No


Recent Infectious Disease Expo:  No


Recent Hopitalizations:  No





Immunizations Up To Date


PED Vaccines UTD:  Yes





Seasonal Allergies


Seasonal Allergies:  No





Past Medical History


Surgeries:  Yes (D&C , Tooth Extraction)


Respiratory:  No


Cardiac:  No


Neurological:  No


Expected Date of Delivery:  Aug 9, 2020


Hx :  2


Hx Para:  0


Hx Total # of Abortions (Sp):  1


Female Reproductive Disorders:  Denies


Sexually Transmitted Disease:  Yes (GC/Chlamydia with this Pregnancy)


HIV/AIDS:  No


Genitourinary:  Yes


UTI-Chronic


Gastrointestinal:  Yes


Gastroesophageal Reflux


Musculoskeletal:  No


Endocrine:  No


HEENT:  No


Loss of Vision:  Denies


Hearing Impairment:  Denies


Cancer:  No


Psychosocial:  No


Integumentary:  Yes (PUPPS with this Pregnancy)


Blood Disorders:  No


Adverse Reaction/Blood Tranf:  No





Family Medical History





Heart murmur


  Grandparents (Maternal Grandmother)


History of seizure in sibling


  G8 BROTHER (At age 7-8 r/t Cyst in Brain)


Myocardial infarction


  Great-Grandparents (Maternal Great-Grandfather)


Neoplasm


  Grandparents (Paternal Grandfather-)


Ovarian cancer


  Great-Grandparents (Maternal Great-Grandmother)


Psychosocial problem


  19 FATHER (PTSD)


Respiratory disorder


  Great-Grandparents (Maternal Great-Grandfather: Mesothelioma)





Review of Systems-General


Constitutional:  see HPI


EENTM:  see HPI


Respiratory:  see HPI


Cardiovascular:  see HPI


Gastrointestinal:  see HPI


Genitourinary:  see HPI


Pregnant:  Yes


Expected Date of Delivery:  Aug 9, 2020


Musculoskeletal:  see HPI


Skin:  see HPI


Psychiatric/Neurological:  See HPI


All Other Systems Reviewed


Negative Unless Noted:  Yes





Physical Exam-General Problems


Physical Exam


Vital Signs





Vital Signs - First Documented








 20





 06:21 07:45


 


Temp 36.0 


 


Pulse 94 


 


Resp 18 


 


B/P (MAP)  133/80 (97)


 


Pulse Ox 97 


 


O2 Delivery Room Air 





Capillary Refill : Less Than 3 Seconds


General Appearance:  WD/WN


HEENT:  PERRL/EOMI


Neck:  non-tender


Respiratory:  chest non-tender


Cardiovascular:  regular rate, rhythm


Gastrointestinal:  normal bowel sounds


Back:  normal inspection


Neurologic/Psychiatric:  normal mood/affect, oriented x 3





Assessment/Plan


Assessment/Plan


Admission Diagnosis/Plan


Diagnosis:


21 yo  @ 40.2 weeks


Failure to progress


Persistent fetal tachycardia


Admission Status:  Inpatient Order (span 2 midnights)


Reason for Inpatient Admission:  


PLTCS





Clinical Quality Measures


DVT/VTE Risk/Contraindication:


Risk Factor Score Per Nursin


RFS Level Per Nursing on Admit:  2=Moderate











BRYCE BRADLEY DO            Aug 11, 2020 21:49

## 2020-08-12 VITALS — DIASTOLIC BLOOD PRESSURE: 66 MMHG | SYSTOLIC BLOOD PRESSURE: 120 MMHG

## 2020-08-12 VITALS — SYSTOLIC BLOOD PRESSURE: 117 MMHG | DIASTOLIC BLOOD PRESSURE: 58 MMHG

## 2020-08-12 VITALS — DIASTOLIC BLOOD PRESSURE: 61 MMHG | SYSTOLIC BLOOD PRESSURE: 107 MMHG

## 2020-08-12 VITALS — DIASTOLIC BLOOD PRESSURE: 73 MMHG | SYSTOLIC BLOOD PRESSURE: 124 MMHG

## 2020-08-12 VITALS — DIASTOLIC BLOOD PRESSURE: 58 MMHG | SYSTOLIC BLOOD PRESSURE: 107 MMHG

## 2020-08-12 VITALS — DIASTOLIC BLOOD PRESSURE: 70 MMHG | SYSTOLIC BLOOD PRESSURE: 118 MMHG

## 2020-08-12 LAB
BASOPHILS # BLD AUTO: 0 10^3/UL (ref 0–0.1)
BASOPHILS NFR BLD AUTO: 0 % (ref 0–10)
EOSINOPHIL # BLD AUTO: 0.2 10^3/UL (ref 0–0.3)
EOSINOPHIL NFR BLD AUTO: 1 % (ref 0–10)
ERYTHROCYTE [DISTWIDTH] IN BLOOD BY AUTOMATED COUNT: 14.2 % (ref 10–14.5)
HCT VFR BLD CALC: 26 % (ref 35–52)
HGB BLD-MCNC: 8.6 G/DL (ref 11.5–16)
LYMPHOCYTES # BLD AUTO: 2.5 X 10^3 (ref 1–4)
LYMPHOCYTES NFR BLD AUTO: 15 % (ref 12–44)
MANUAL DIFFERENTIAL PERFORMED BLD QL: NO
MCH RBC QN AUTO: 28 PG (ref 25–34)
MCHC RBC AUTO-ENTMCNC: 33 G/DL (ref 32–36)
MCV RBC AUTO: 87 FL (ref 80–99)
MONOCYTES # BLD AUTO: 1.2 X 10^3 (ref 0–1)
MONOCYTES NFR BLD AUTO: 7 % (ref 0–12)
NEUTROPHILS # BLD AUTO: 13.1 X 10^3 (ref 1.8–7.8)
NEUTROPHILS NFR BLD AUTO: 77 % (ref 42–75)
PLATELET # BLD: 169 10^3/UL (ref 130–400)
PMV BLD AUTO: 11.5 FL (ref 7.4–10.4)
WBC # BLD AUTO: 17 10^3/UL (ref 4.3–11)

## 2020-08-12 RX ADMIN — Medication SCH ML: at 08:15

## 2020-08-12 RX ADMIN — HYDROCODONE BITARTRATE AND ACETAMINOPHEN PRN TAB: 5; 325 TABLET ORAL at 16:14

## 2020-08-12 RX ADMIN — HYDROCODONE BITARTRATE AND ACETAMINOPHEN PRN TAB: 5; 325 TABLET ORAL at 04:27

## 2020-08-12 RX ADMIN — HYDROCODONE BITARTRATE AND ACETAMINOPHEN PRN TAB: 5; 325 TABLET ORAL at 21:17

## 2020-08-12 RX ADMIN — KETOROLAC TROMETHAMINE SCH MG: 30 INJECTION, SOLUTION INTRAMUSCULAR; INTRAVENOUS at 04:12

## 2020-08-12 RX ADMIN — METOCLOPRAMIDE SCH MG: 10 TABLET ORAL at 12:33

## 2020-08-12 RX ADMIN — Medication SCH ML: at 10:41

## 2020-08-12 RX ADMIN — METOCLOPRAMIDE SCH MG: 10 TABLET ORAL at 07:10

## 2020-08-12 RX ADMIN — IBUPROFEN SCH MG: 600 TABLET ORAL at 21:16

## 2020-08-12 RX ADMIN — METOCLOPRAMIDE SCH MG: 10 TABLET ORAL at 20:24

## 2020-08-12 RX ADMIN — DOCUSATE SODIUM SCH MG: 100 CAPSULE ORAL at 21:16

## 2020-08-12 RX ADMIN — METOCLOPRAMIDE SCH MG: 10 TABLET ORAL at 18:17

## 2020-08-12 RX ADMIN — IBUPROFEN SCH MG: 600 TABLET ORAL at 16:14

## 2020-08-12 RX ADMIN — HYDROCODONE BITARTRATE AND ACETAMINOPHEN PRN TAB: 5; 325 TABLET ORAL at 10:42

## 2020-08-12 RX ADMIN — DOCUSATE SODIUM SCH MG: 100 CAPSULE ORAL at 10:40

## 2020-08-12 RX ADMIN — KETOROLAC TROMETHAMINE SCH MG: 30 INJECTION, SOLUTION INTRAMUSCULAR; INTRAVENOUS at 10:40

## 2020-08-12 NOTE — NUR
Assisted pt up to bathroom to void. tolerated well. Void small amount. Sil care done. 
Minimal rubra noted. No clots. Back to bed. SCD's in place. IS done. tolerated well.

## 2020-08-12 NOTE — POSTPARTUM PROGRESS NOTE
PREMA DOS SANTOS MD 20 0752:


Postpartum Note


Postpartum Note


Postpartum and postoperative Day # [1]





Subjective:


Patient is without complaints. Ambulating, voiding. Tolerating a regular diet 

without nausea or vomiting. Normal lochia. Pain is well controlled with oral 

pain medications. Her pain is exacerbated with walking and torso movement.  

[Breast] feeding well. She is passing gas but has yet to have a BM. She does 

admits to mild dysuria. 





Objective:


[Vitals: temp 35.9, , /58, RR 18 ]





Physical Exam:


General - Alert and oriented, no apparent distress


CV - Rate 90, regular rhythm; peripheral pulses palpable


Pulm - CTA bilaterally with normal resp. effort


Abdomen - Soft, appropriately tender to palpation, non-distended, fundus firm at

umbilicus, Pfannenstiel incision c/d/i


Extremities - mild nonpitting edema, nonTTP


Neuro - grossly intact


Psych - Calm and cooperative





Assessment:


21yo 


Pregnancy complications: PUPPS, +NG/CT (-KAY)


Post-partum day # [1], status post  following arrest of labor. 


Recovering well, hemodynamically stable (AP hgb: 11.7, PP hgb 8.6)


Rubella equivocal





Plan:


Routine postpartum and postoperative care.


PO pain meds prn


MMR to be administered prior to discharge


Encourage breast feeding.


Encourage ambulation.


Plan for discharge []





Patient to be seen and discussed with Dr. Longoria.





Vitals - Labs


Vital Signs - I&O





Vital Signs








  Date Time  Temp Pulse Resp B/P (MAP) Pulse Ox O2 Delivery O2 Flow Rate FiO2


 


20 04:35 35.9 105 18 107/58 (74) 98 Room Air  


 


20 00:15 36.4 122 18 118/70 (86)    


 


20 22:45 37.1  22 109/78 (88) 99 Room Air  


 


20 22:45      Room Air  


 


20 22:40   22 107/76 (86) 99 Room Air  


 


20 22:37      Room Air  


 


20 22:30   18 108/79 (89) 100 Room Air  


 


20 22:28      Room Air  


 


20 22:20   22 102/64 (77) 99 Room Air  


 


20 22:10   22 90/31 (50) 100 Room Air  


 


20 22:10      Room Air  


 


20 22:05   22 100/61 (74) 100 Room Air  


 


20 21:57      Room Air  


 


20 21:57 37.2  22 113/64 (80) 100 Room Air  


 


20 20:40  112 20 145/83 (103) 97 Room Air  


 


20 20:20 36.5 100 20 135/81 (99) 98   


 


20 20:00  87 18 111/75 (87) 99   


 


20 19:40  96 18 129/63 (85) 99   


 


20 19:20 36.5 97 20 132/74 (93) 98   


 


20 19:00  97 18 120/76 (91) 99 Room Air  


 


20 18:45  97 18 132/78 (96) 99 Room Air  


 


20 18:30 36.3 97 18 132/78 (96) 98 Room Air  


 


20 18:15  106 18 107/59 (75) 98 Room Air  


 


20 18:00  101 18 98/55 (69) 99 Room Air  


 


20 17:45  100 18 100/53 (69) 99 Room Air  


 


20 17:30 36.6 95 18 110/72 (85) 97 Room Air  


 


20 17:15  86 18 100/57 (71) 96 Room Air  


 


20 17:00  86 18 99/57 (71) 97 Room Air  


 


20 16:45  78 18 101/55 (70) 97 Room Air  


 


20 16:30 36.4 86 18 115/64 (81) 96 Room Air  


 


20 16:15  86 18 111/69 (83) 97 Room Air  


 


20 16:00 36.1 83 18 111/72 (85) 97 Room Air  


 


20 15:45  96 18 122/85 (97) 98 Room Air  


 


20 15:30 35.7 100 18 117/73 (88) 98 Room Air  


 


20 15:15  96 18 125/77 (93) 97 Room Air  


 


20 15:00  93 18 127/70 (89) 98 Room Air  


 


20 14:45  95 18 121/69 (86) 97 Room Air  


 


20 14:30  99 18 122/69 (86) 98 Room Air  


 


20 14:15  127 18 135/71 (92) 98 Room Air  


 


20 14:00  100 18 131/70 (90) 98 Room Air  


 


20 13:45  102 18 134/63 (86) 98 Room Air  


 


20 13:30  98 18 133/88 (103) 98 Room Air  


 


20 13:15 36.0 94 18 122/75 (91) 98 Room Air  


 


20 13:00  83 18 126/74 (91) 98 Room Air  


 


20 12:45  87 18 129/78 (95) 98 Room Air  


 


20 12:30 36.3 93 18 135/77 (96) 98 Room Air  


 


20 12:15  85 18 132/74 (93) 97 Room Air  


 


20 12:00  85 18 141/71 (94) 97 Room Air  


 


20 11:45  107 18 145/75 (98) 97 Room Air  


 


20 11:33  97 18 139/71 (93) 97 Room Air  


 


20 11:30  97 18 138/84 (102) 98 Room Air  


 


20 11:27  94 18 137/87 (104) 97 Room Air  


 


20 11:26  104 18 136/80 (98) 98 Room Air  


 


20 11:18  100 18 142/88 (106) 98 Room Air  


 


20 11:16  100 18 136/79 (98) 98 Room Air  


 


20 11:15  101 18 136/79 (98)  Room Air  


 


20 11:00  95 18 135/82 (99)  Room Air  


 


20 10:45 36.3 93 18 126/84 (98)  Room Air  


 


20 10:30  79 18 119/67 (84)  Room Air  


 


20 10:15  84 18 139/82 (101)  Room Air  


 


20 10:00  88 18 132/79 (96)  Room Air  


 


20 09:45  83 18 127/75 (92)  Room Air  


 


20 09:30  83 18 139/79 (99)  Room Air  


 


20 09:15   18   Room Air  


 


20 09:00   18   Room Air  


 


20 08:45  90 18 119/76 (90)  Room Air  


 


20 08:30  90 18 120/77 (91)  Room Air  


 


20 08:15  98 18 135/85 (102)  Room Air  


 


20 08:00  83 18 127/78 (94)  Room Air  


 


20 07:45  82 18 133/80 (97)  Room Air  














I & O 


 


 20





 07:00


 


Intake Total 3050 ml


 


Output Total 1200 ml


 


Balance 1850 ml











Labs


Laboratory Tests


20 07:44: 


White Blood Count 10.9, Red Blood Count 4.03L, Hemoglobin 11.7, Hematocrit 34L, 

Mean Corpuscular Volume 85, Mean Corpuscular Hemoglobin 29, Mean Corpuscular 

Hemoglobin Concent 34, Red Cell Distribution Width 14.1, Platelet Count 210, Me

an Platelet Volume 11.3H, Neutrophils (%) (Auto) 65, Lymphocytes (%) (Auto) 23, 

Monocytes (%) (Auto) 9, Eosinophils (%) (Auto) 3, Basophils (%) (Auto) 0, 

Neutrophils # (Auto) 7.1, Lymphocytes # (Auto) 2.5, Monocytes # (Auto) 1.0, 

Eosinophils # (Auto) 0.3, Basophils # (Auto) 0.0


20 04:47: 


White Blood Count 17.0H, Red Blood Count 3.03L, Hemoglobin 8.6#L, Hematocrit 26L

, Mean Corpuscular Volume 87, Mean Corpuscular Hemoglobin 28, Mean Corpuscular 

Hemoglobin Concent 33, Red Cell Distribution Width 14.2, Platelet Count 169, 

Mean Platelet Volume 11.5H, Neutrophils (%) (Auto) 77H, Lymphocytes (%) (Auto) 

15, Monocytes (%) (Auto) 7, Eosinophils (%) (Auto) 1, Basophils (%) (Auto) 0, 

Neutrophils # (Auto) 13.1H, Lymphocytes # (Auto) 2.5, Monocytes # (Auto) 1.2H, 

Eosinophils # (Auto) 0.2, Basophils # (Auto) 0.0





LESLI LONGORIA MD 20 1305:


Supervisory-Addendum Brief


Supervisory Addendum


Pt seen by Dr. Drew, I did not see her postpartum, cannot confirm resident 

note, see Dr. Drew's notes.











PREMA DOS SANTOS MD              Aug 12, 2020 07:52


LESLI LONGORIA MD             Aug 14, 2020 13:05

## 2020-08-12 NOTE — OPERATIVE REPORT
DATE OF SERVICE:  



PREOPERATIVE DIAGNOSES:

1.  A 20-year-old G2, P0 at 40 weeks and 2 days gestation.

2.  Failure to progress.

3.  Persistent fetal tachycardia.



POSTOPERATIVE DIAGNOSES:

1.  A 20-year-old G2, P0 at 40 weeks and 2 days gestation.

2.  Failure to progress.

3.  Persistent fetal tachycardia.

4.  Nuchal cord x1.



PROCEDURE:

Primary low transverse  section.



SURGEON:

Dio Drew DO



ASSISTANT:

Saima Choe, 3rd year PA student.



ANESTHESIA:

Epidural, which was bolused.



ESTIMATED BLOOD LOSS:

450 mL.



URINE OUTPUT:

300 mL pink tinged at the end of the procedure.



FLUIDS:

1000 mL lactated Ringer's solution.



FINDINGS:

A live male infant weighing 6 pounds 14 ounces, Apgars of 7 and 9.  Grossly

normal appearing uterus, bilateral fallopian tubes and ovaries.



SPECIMEN SENT:

Placenta.



INDICATIONS FOR PROCEDURE:

A 20-year-old female was a patient that was admitted and sought prenatal care

with Dr. Longoria.  Her prenatal care was uncomplicated.  She was induced for

postdates.  AROM and Pitocin augmentation was used as well as epidural was

achieved for analgesia.  She progressed to 6 cm, at which point she made no

cervical change for over 4 hours and there was also persistent fetal

tachycardia.  Due to this fetal tachycardia and remote from delivery,

recommendation was made to proceed with .  Risks of the procedure was

reviewed with the patient in detail and after all of her questions were answered

in the preoperative area, consent was obtained with her mother present and the

patient was taken to the operating room.



OPERATIVE REPORT IN DETAIL:

Once in the operating room, epidural analgesia was bolused and found to be

adequate, placed in supine position with leftward tilt, prepped and draped in

normal sterile fashion.  A timeout was performed and anesthesia was tested.  I

then make a Pfannenstiel skin incision with a knife and carried down layer of

fascia using Bovie cautery.  Superior aspect of the fascial incision was then

grasped with Kocher clamps, tented up and dissected off the underlying rectus

muscles.  The inferior aspect of the fascial incision was then grasped with

Kocher clamps, tented up and dissected off the underlying rectus muscles. 

Rectus muscles were then dissected down the midline using Matamoros scissors, which

exposed the peritoneum, which I entered bluntly and extended using blunt

traction.  I then placed an Matteo ring retractor within the peritoneal

incision, which offers excellent lateral sidewall retraction.  The lower uterine

segment was found to be thinned out and I make a low transverse incision to the

vesicouterine peritoneum and bluntly dissected off the lower uterine segment.  I

proceeded with myotomy until membranes were visualized, at which point I

extended the uterine incision laterally and superiorly using bandage scissors. 

Infant was found in vertex presentation.  With gentle fundal pressure, the

infant's head was elevated up the incision where the nares and oropharynx bulb

suctioned.  Nuchal cord was reduced x1.  Anterior and posterior shoulders were

delivered.  Infant was then brought to the operative field where the cord was

doubly clamped and cut and infant was handed off to Dr. Longoria, who was present

for delivery.  Cord blood was collected.  A 3-vessel cord with intact placenta

was delivered spontaneously thereafter.  IV Pitocin was initiated to facilitate

uterine contraction.  Uterine fundus became firmer with bimanual massage. 

Uterus was exteriorized and cleared of all endometrial clots and debris.  I then

proceeded with closing the uterine incision using 0 Vicryl suture in running

locked fashion.  Second layer of imbricating 0 Monocryl was placed.  Excellent

hemostasis was noted after doing this.  I then placed the uterus back in the

pelvis and copiously irrigated the pelvis using normal saline.  Once again,

there was no active bleeding noted from any of my dissection planes.  I placed

Interceed antiadhesive over my low transverse incision and proceeded with

removing the Matteo ring retractor.  I reapproximated the peritoneum using 3-0

Vicryl suture in a running fashion.  The rectus muscle reapproximated using 3-0

Vicryl suture in interrupted fashion.  The fascia was reapproximated using 0

Vicryl suture in running fashion.  Subcutaneous tissue was reapproximated using

3-0 plain in interrupted subcutaneous stitch and skin was reapproximated using

4-0 Monocryl running subcuticular.  Dermabond was applied to incision and

sterile dressing with adhesive white tape.  The patient tolerated the procedure

well and sent to recovery in stable condition.  Lap and sponge count were

correct at the end of procedure.  Instrument counts correct as well.  Two grams

of Ancef were given probably for infection prophylaxis.





Job ID: [id number]

DocumentID: [id number]

Dictated Date:  2020 21:53:07

Transcription Date: 2020 03:47:56

Dictated By: DIO DREW DO

## 2020-08-12 NOTE — POSTPARTUM PROGRESS NOTE
Postpartum Note


Postpartum Note


Postpartum Day # 1





Subjective:


Patient is without complaints. Ambulating, voiding. Tolerating a regular diet 

without nausea or vomiting. Normal lochia. Pain is well controlled with oral 

pain medications. [





Objective:








Physical Exam:


General - Alert and oriented, no apparent distress


Abdomen - Soft, appropriately tender to palpation, non-distended, fundus firm at

 umbilicus


Extremities - no edema, negative Lizzie's bilaterally 


Incision- c/d/i





Assessment:


POD 1 PLTCS


Acute blood loss anemia








Plan:


Routine postpartum care.


Encourage breast feeding.


Encourage ambulation.


Ferrous sulfate supplementation.


Plan for discharge tomorrow





Vitals - Labs


Vital Signs - I&O





Vital Signs








  Date Time  Temp Pulse Resp B/P (MAP) Pulse Ox O2 Delivery O2 Flow Rate FiO2


 


8/12/20 04:35 35.9 105 18 107/58 (74) 98 Room Air  


 


8/12/20 00:15 36.4 122 18 118/70 (86)    


 


8/11/20 22:45 37.1  22 109/78 (88) 99 Room Air  


 


8/11/20 22:45      Room Air  


 


8/11/20 22:40   22 107/76 (86) 99 Room Air  


 


8/11/20 22:37      Room Air  


 


8/11/20 22:30   18 108/79 (89) 100 Room Air  


 


8/11/20 22:28      Room Air  


 


8/11/20 22:20   22 102/64 (77) 99 Room Air  


 


8/11/20 22:10   22 90/31 (50) 100 Room Air  


 


8/11/20 22:10      Room Air  


 


8/11/20 22:05   22 100/61 (74) 100 Room Air  


 


8/11/20 21:57      Room Air  


 


8/11/20 21:57 37.2  22 113/64 (80) 100 Room Air  


 


8/11/20 20:40  112 20 145/83 (103) 97 Room Air  


 


8/11/20 20:20 36.5 100 20 135/81 (99) 98   


 


8/11/20 20:00  87 18 111/75 (87) 99   


 


8/11/20 19:40  96 18 129/63 (85) 99   


 


8/11/20 19:20 36.5 97 20 132/74 (93) 98   


 


8/11/20 19:00  97 18 120/76 (91) 99 Room Air  


 


8/11/20 18:45  97 18 132/78 (96) 99 Room Air  


 


8/11/20 18:30 36.3 97 18 132/78 (96) 98 Room Air  


 


8/11/20 18:15  106 18 107/59 (75) 98 Room Air  


 


8/11/20 18:00  101 18 98/55 (69) 99 Room Air  


 


8/11/20 17:45  100 18 100/53 (69) 99 Room Air  


 


8/11/20 17:30 36.6 95 18 110/72 (85) 97 Room Air  


 


8/11/20 17:15  86 18 100/57 (71) 96 Room Air  


 


8/11/20 17:00  86 18 99/57 (71) 97 Room Air  


 


8/11/20 16:45  78 18 101/55 (70) 97 Room Air  


 


8/11/20 16:30 36.4 86 18 115/64 (81) 96 Room Air  


 


8/11/20 16:15  86 18 111/69 (83) 97 Room Air  


 


8/11/20 16:00 36.1 83 18 111/72 (85) 97 Room Air  


 


8/11/20 15:45  96 18 122/85 (97) 98 Room Air  


 


8/11/20 15:30 35.7 100 18 117/73 (88) 98 Room Air  


 


8/11/20 15:15  96 18 125/77 (93) 97 Room Air  


 


8/11/20 15:00  93 18 127/70 (89) 98 Room Air  


 


8/11/20 14:45  95 18 121/69 (86) 97 Room Air  


 


8/11/20 14:30  99 18 122/69 (86) 98 Room Air  


 


8/11/20 14:15  127 18 135/71 (92) 98 Room Air  


 


8/11/20 14:00  100 18 131/70 (90) 98 Room Air  


 


8/11/20 13:45  102 18 134/63 (86) 98 Room Air  


 


8/11/20 13:30  98 18 133/88 (103) 98 Room Air  


 


8/11/20 13:15 36.0 94 18 122/75 (91) 98 Room Air  


 


8/11/20 13:00  83 18 126/74 (91) 98 Room Air  


 


8/11/20 12:45  87 18 129/78 (95) 98 Room Air  


 


8/11/20 12:30 36.3 93 18 135/77 (96) 98 Room Air  


 


8/11/20 12:15  85 18 132/74 (93) 97 Room Air  


 


8/11/20 12:00  85 18 141/71 (94) 97 Room Air  


 


8/11/20 11:45  107 18 145/75 (98) 97 Room Air  


 


8/11/20 11:33  97 18 139/71 (93) 97 Room Air  


 


8/11/20 11:30  97 18 138/84 (102) 98 Room Air  


 


8/11/20 11:27  94 18 137/87 (104) 97 Room Air  


 


8/11/20 11:26  104 18 136/80 (98) 98 Room Air  


 


8/11/20 11:18  100 18 142/88 (106) 98 Room Air  


 


8/11/20 11:16  100 18 136/79 (98) 98 Room Air  


 


8/11/20 11:15  101 18 136/79 (98)  Room Air  


 


8/11/20 11:00  95 18 135/82 (99)  Room Air  


 


8/11/20 10:45 36.3 93 18 126/84 (98)  Room Air  


 


8/11/20 10:30  79 18 119/67 (84)  Room Air  


 


8/11/20 10:15  84 18 139/82 (101)  Room Air  


 


8/11/20 10:00  88 18 132/79 (96)  Room Air  


 


8/11/20 09:45  83 18 127/75 (92)  Room Air  


 


8/11/20 09:30  83 18 139/79 (99)  Room Air  


 


8/11/20 09:15   18   Room Air  


 


8/11/20 09:00   18   Room Air  


 


8/11/20 08:45  90 18 119/76 (90)  Room Air  


 


8/11/20 08:30  90 18 120/77 (91)  Room Air  














I & O 


 


 8/12/20





 07:00


 


Intake Total 3050 ml


 


Output Total 1200 ml


 


Balance 1850 ml











Labs


Laboratory Tests


8/12/20 04:47: 


White Blood Count 17.0H, Red Blood Count 3.03L, Hemoglobin 8.6#L, Hematocrit 26L

, Mean Corpuscular Volume 87, Mean Corpuscular Hemoglobin 28, Mean Corpuscular 

Hemoglobin Concent 33, Red Cell Distribution Width 14.2, Platelet Count 169, 

Mean Platelet Volume 11.5H, Neutrophils (%) (Auto) 77H, Lymphocytes (%) (Auto) 

15, Monocytes (%) (Auto) 7, Eosinophils (%) (Auto) 1, Basophils (%) (Auto) 0, 

Neutrophils # (Auto) 13.1H, Lymphocytes # (Auto) 2.5, Monocytes # (Auto) 1.2H, 

Eosinophils # (Auto) 0.2, Basophils # (Auto) 0.0











BRYCE BRADLEY DO           Aug 12, 2020 8:19 am

## 2020-08-12 NOTE — ANESTHESIA-REGIONAL POST-OP
Regional


Patient Condition


Mental Status:  Alert, Oriented x3


Circulation:  Same as Pre-Op


Headache:  Absent


Sensation:  Full Recovery


Motor Block:  Absent





Post Op Complications


Complications


None





Follow Up Care/Instructions


Patient Instructions


None needed.





Anesthesia/Patient Condition


Patient is doing well, no complaints, stable vital signs, no apparent adverse 

anesthesia problems.   


No complications reported per nursing.











PABLO YEAGER CRNA            Aug 12, 2020 08:32

## 2020-08-13 VITALS — DIASTOLIC BLOOD PRESSURE: 65 MMHG | SYSTOLIC BLOOD PRESSURE: 104 MMHG

## 2020-08-13 VITALS — DIASTOLIC BLOOD PRESSURE: 81 MMHG | SYSTOLIC BLOOD PRESSURE: 132 MMHG

## 2020-08-13 VITALS — SYSTOLIC BLOOD PRESSURE: 132 MMHG | DIASTOLIC BLOOD PRESSURE: 81 MMHG

## 2020-08-13 RX ADMIN — IBUPROFEN SCH MG: 600 TABLET ORAL at 09:21

## 2020-08-13 RX ADMIN — IBUPROFEN SCH MG: 600 TABLET ORAL at 03:30

## 2020-08-13 RX ADMIN — METOCLOPRAMIDE SCH MG: 10 TABLET ORAL at 03:30

## 2020-08-13 RX ADMIN — METOCLOPRAMIDE SCH MG: 10 TABLET ORAL at 09:20

## 2020-08-13 RX ADMIN — HYDROCODONE BITARTRATE AND ACETAMINOPHEN PRN TAB: 5; 325 TABLET ORAL at 03:31

## 2020-08-13 RX ADMIN — DOCUSATE SODIUM SCH MG: 100 CAPSULE ORAL at 09:21

## 2020-08-13 NOTE — NUR
DISCHARGE INSTRUCTIONS REVIEWED WITH COPY TO PT. RXS GIVEN. STATES UNDERSTANDING OF ALL 
INSTRUCTIONS AND NEED TO F/U AS SCHEDULED AND AS NEEDED.

## 2020-08-13 NOTE — DISCHARGE INST-WOMEN'S SERVICE
Discharge Inst-Women's Serv


Depart Medication/Instructions


New, Converted or Re-Newed RX:  RX on Chart


Final Diagnosis


POD 2 PLTCS


Acute blood loss anemia


Problems Reviewed?:  Yes





Consults/Follow Up


Additional Follow Up:  Yes


Orders/Referrals


Dr. Drew in 7-10 days and Dr. Longoria at 6 weeks





Activity


Activity:  Activity as Tolerated


Driving Instructions:  No Driving for 1 Week


NO SMOKING:  NO SMOKING


Nothing Inside Vagina:  No Douching, No Tampons





Diet


Discharge Diet:  No Restrictions


Symptoms to Report to :  Bleeding Excessive, Pain Increased, Fever Over 101 

Degrees F, Vaginal Bleeding Increase, Questions/Concerns


For Any Problems or Questions:  Contact Your Physician





Skin/Wound Care


Infection Signs and Symptoms:  Increased Redness, Foul Odor of Wound, Increased 

Drainage, Skin Itchy or Has a Rash, Increased Swelling, Temperature Above 101  F


Operative Area Clean and Dry:  Keep Incision Clean/Dry


Stitches/Staples/Dermabond:  Dermabond, Care of Stitches


Bathing Instructions:  BRYCE Connors DO            Aug 13, 2020 08:11

## 2020-08-13 NOTE — POSTPARTUM PROGRESS NOTE
Postpartum Note


Postpartum Note


Postpartum Day # 2





Subjective:


Patient is without complaints. Ambulating, voiding. Tolerating a regular diet 

without nausea or vomiting. Normal lochia. Pain is well controlled with oral 

pain medications. 





Objective:








Physical Exam:


General - Alert and oriented, no apparent distress


Abdomen - Soft, appropriately tender to palpation, non-distended, fundus firm at

umbilicus


Extremities - no edema, negative Lizzie's bilaterally 


Incision- c/d/i





Assessment:


POD 2 PLTCS


Acute blood loss anemia








Plan:


Routine postpartum care.


Encourage breast feeding.


Encourage ambulation.


Ferrous sulfate supplementation.


Plan for discharge today





Vitals - Labs


Vital Signs - I&O





Vital Signs








  Date Time  Temp Pulse Resp B/P (MAP) Pulse Ox O2 Delivery O2 Flow Rate FiO2


 


8/13/20 03:30 37.2 89 18 104/65 (78) 98 Room Air  


 


8/12/20 20:10 36.6 100 18 124/73 (90) 99 Room Air  


 


8/12/20 15:14 36.8 107 18 107/61 (76) 96 Room Air  


 


8/12/20 12:32 37.0 103 18 120/66 (84) 96 Room Air  


 


8/12/20 08:22 36.7 107 18 117/58 (77) 96 Room Air  

















BRYCE BRADLEY DO            Aug 13, 2020 08:15

## 2020-08-13 NOTE — NUR
DISMISSED FROM [facility] VIA W/C WITH INFANT IN STABLE CONDITION TO FAMILY CAR ACC BY MOTHER AND 
JOHN PAUL KIRKLAND RN.

## 2021-12-26 ENCOUNTER — HOSPITAL ENCOUNTER (EMERGENCY)
Dept: HOSPITAL 75 - ER | Age: 21
LOS: 1 days | Discharge: HOME | End: 2021-12-27
Payer: MEDICAID

## 2021-12-26 VITALS — BODY MASS INDEX: 34.61 KG/M2 | WEIGHT: 188.05 LBS | HEIGHT: 61.81 IN

## 2021-12-26 DIAGNOSIS — O99.351: Primary | ICD-10-CM

## 2021-12-26 DIAGNOSIS — O21.0: ICD-10-CM

## 2021-12-26 DIAGNOSIS — G43.109: ICD-10-CM

## 2021-12-26 DIAGNOSIS — R10.9: ICD-10-CM

## 2021-12-26 DIAGNOSIS — O26.891: ICD-10-CM

## 2021-12-26 DIAGNOSIS — Z3A.13: ICD-10-CM

## 2021-12-26 DIAGNOSIS — Z20.822: ICD-10-CM

## 2021-12-26 LAB
APTT PPP: YELLOW S
BACTERIA #/AREA URNS HPF: (no result) /HPF
BILIRUB UR QL STRIP: NEGATIVE
FIBRINOGEN PPP-MCNC: CLEAR MG/DL
GLUCOSE UR STRIP-MCNC: NEGATIVE MG/DL
KETONES UR QL STRIP: (no result)
LEUKOCYTE ESTERASE UR QL STRIP: (no result)
NITRITE UR QL STRIP: NEGATIVE
PH UR STRIP: 6.5 [PH] (ref 5–9)
PROT UR QL STRIP: NEGATIVE
RBC #/AREA URNS HPF: (no result) /HPF
SP GR UR STRIP: 1.02 (ref 1.02–1.02)
WBC #/AREA URNS HPF: (no result) /HPF

## 2021-12-26 PROCEDURE — 87636 SARSCOV2 & INF A&B AMP PRB: CPT

## 2021-12-26 PROCEDURE — 99283 EMERGENCY DEPT VISIT LOW MDM: CPT

## 2021-12-26 PROCEDURE — 81000 URINALYSIS NONAUTO W/SCOPE: CPT

## 2021-12-27 VITALS — SYSTOLIC BLOOD PRESSURE: 125 MMHG | DIASTOLIC BLOOD PRESSURE: 68 MMHG

## 2021-12-27 NOTE — ED GENERAL
General


Chief Complaint:  Neurological Problems


Stated Complaint:  13 WKS PREG - HEADACHE/ABD PAIN/ R ARM PAIN


Nursing Triage Note:  


PT C/O HEADACHE/NECK PAIN TODAY, LOSS OF VISION OUT OF RIGHT EYE AND 


FORGETFULNESS X1HR TODAY. REPORTS VISION RETURNED AFTER NAP.


Source of Information:  Patient


Exam Limitations:  No Limitations





History of Present Illness


Date Seen by Provider:  Dec 26, 2021


Time Seen by Provider:  22:43


Initial Comments


This 21-year-old young lady is at about 13 weeks gestational age.  She presents 

with headache of severe onset this morning.  She reports persistent nausea and 

vomiting throughout the pregnancy and having difficulty staying hydrated.  Along

with this headache she had a disturbance of vision in the right eye that by 

description seems consistent with migraine aura.  She also had difficulty with 

memory around the same time.  She could not even remember her son's name at that

time.  She took a nap and the visual disturbance resolved.  She reports having a

"cold" throughout the weekend.  Those symptoms have since improved.  She reports

having ultrasounds performed x2 by Dr. Etienne who provides her prenatal care.  

She has had some ongoing right-sided abdominal pain for which she has been 

evaluated.  Patient states she does have a history of migraines in the past.





Allergies and Home Medications


Allergies


Coded Allergies:  


     No Known Drug Allergies (Unverified , 20)





Patient Home Medication List


Home Medication List Reviewed:  Yes


Promethazine HCl (Promethazine Tablet) 25 Mg Tablet, 25 MG PO Q6H PRN for 

NAUSEA/VOMITING


   Prescribed by: MARIO ABDI on 21 0117


Discontinued Medications


Acetaminophen (Tylenol) 325 Mg Capsule, 325 MG PO Q4H PRN for PAIN-MILD (1-4)


   Discontinued Reason: No Longer Taking


   Prescribed by: PREMA DOS SANTOS on 8/3/20 1728


   Last Action: Discontinued


Docusate Sodium (Dok) 100 Mg Capsule, 100 MG PO BID PRN for CONSTIPATION-1ST 

LINE


   Discontinued Reason: No Longer Taking


   Prescribed by: BRYCE BRADLEY on 20 0810


   Last Action: Discontinued


Hydrocodone/Acetaminophen (Hydrocodone-Acetamin 5-325 mg) 1 Each Tablet, 1-2 TAB

PO Q6HR PRN for PAIN-MODERATE (5-7)


   Discontinued Reason: No Longer Taking


   Prescribed by: BRYCE BRADLEY on 20 0810


   Last Action: Discontinued


Ibuprofen (Ibu) 600 Mg Tablet, 600 MG PO Q6H


   Discontinued Reason: No Longer Taking


   Prescribed by: BRYCE BRADLEY on 20 0810


   Last Action: Discontinued


Iml910/FA/Omega3/Dha/Fish Oil (Prenatal Gummies) 1 Each Tab.chew, 2 EACH PO 

DAILY, (Reported)


   Discontinued Reason: No Longer Taking


   Entered as Reported by: CALIN CASTILLO on 20 0853


   Last Action: Discontinued





Review of Systems


Review of Systems


Constitutional:  no symptoms reported


EENTM:  see HPI


Respiratory:  see HPI


Gastrointestinal:  see HPI


Genitourinary:  see HPI


Pregnant:  Yes


Musculoskeletal:  no symptoms reported


Skin:  no symptoms reported


Psychiatric/Neurological:  See HPI


Hematologic/Lymphatic:  No Symptoms Reported


Immunological/Allergic:  no symptoms reported





Past Medical-Social-Family Hx


Patient Social History


Tobacco Use?:  No


Substance use?:  No


Alcohol Use?:  No


Pt feels they are or have been:  No





Immunizations Up To Date


PED Vaccines UTD:  Yes





Seasonal Allergies


Seasonal Allergies:  No





Past Medical History


Surgery/Hospitalization HX:  


D&C, T/A, 


Surgeries:  Yes (D&C 2019, Tooth Extraction)


Respiratory:  No


Cardiac:  No


Neurological:  Yes


Headaches /Migraines


Pregnant:  Yes


Last Menstrual Period:  2021


Female Reproductive Disorders:  Denies


Sexually Transmitted Disease:  Yes (GC/Chlamydia with this Pregnancy)


HIV/AIDS:  No


Genitourinary:  Yes


UTI-Chronic


Gastrointestinal:  Yes


Gastroesophageal Reflux


Musculoskeletal:  No


Endocrine:  No


HEENT:  No


Loss of Vision:  Denies


Hearing Impairment:  Denies


Cancer:  No


Psychosocial:  No


Integumentary:  Yes (PUPPS with this Pregnancy)


Blood Disorders:  No


Adverse Reaction/Blood Tranf:  No





Family Medical History





Heart murmur


  Grandparents (Maternal Grandmother)


History of seizure in sibling


  G8 BROTHER (At age 7-8 r/t Cyst in Brain)


Myocardial infarction


  Great-Grandparents (Maternal Great-Grandfather)


Neoplasm


  Grandparents (Paternal Grandfather-)


Ovarian cancer


  Great-Grandparents (Maternal Great-Grandmother)


Psychosocial problem


  19 FATHER (PTSD)


Respiratory disorder


  Great-Grandparents (Maternal Great-Grandfather: Mesothelioma)





Physical Exam


Vital Signs





Vital Signs - First Documented








 21





 22:45


 


Temp 36.7


 


Pulse 85


 


Resp 16


 


B/P (MAP) 134/84 (101)


 


Pulse Ox 97


 


O2 Delivery Room Air





Capillary Refill : Less Than 3 Seconds


Height, Weight, BMI


Height: '"


Weight: lbs. oz. kg; 34.00 BMI


Method:


General Appearance:  No Apparent Distress, WD/WN


HEENT:  PERRL/EOMI, Normal ENT Inspection, Pharynx Normal


Neck:  Normal Inspection


Respiratory:  Lungs Clear, Normal Breath Sounds, No Accessory Muscle Use


Cardiovascular:  Regular Rate, Rhythm, No Edema, No Murmur


Gastrointestinal:  Normal Bowel Sounds, Soft, Tenderness (right mid abdomen)


Extremity:  Normal Inspection, No Pedal Edema


Neurologic/Psychiatric:  Alert, Oriented x3, No Motor/Sensory Deficits, Normal 

Mood/Affect, CNs II-XII Norm as Tested


Skin:  Normal Color, Warm/Dry





Progress/Results/Core Measures


Suspected Sepsis


SIRS


Temperature: 


Pulse: 85 


Respiratory Rate: 16


 


Blood Pressure 134 /84 


Mean: 101





Results/Orders


Lab Results





Laboratory Tests








Test


 21


22:50 Range/Units


 


 


Urine Color YELLOW   


 


Urine Clarity CLEAR   


 


Urine pH 6.5  5-9  


 


Urine Specific Gravity 1.020  1.016-1.022  


 


Urine Protein NEGATIVE  NEGATIVE  


 


Urine Glucose (UA) NEGATIVE  NEGATIVE  


 


Urine Ketones 3+ H NEGATIVE  


 


Urine Nitrite NEGATIVE  NEGATIVE  


 


Urine Bilirubin NEGATIVE  NEGATIVE  


 


Urine Urobilinogen 0.2  < = 1.0  MG/DL


 


Urine Leukocyte Esterase 1+ H NEGATIVE  


 


Urine RBC (Auto) NEGATIVE  NEGATIVE  


 


Urine RBC NONE   /HPF


 


Urine WBC 2-5   /HPF


 


Urine Squamous Epithelial


Cells 10-25 H


  /HPF





 


Urine Crystals NONE   /LPF


 


Urine Bacteria TRACE   /HPF


 


Urine Casts NONE   /LPF


 


Urine Mucus MODERATE H  /LPF


 


Urine Culture Indicated NO   


 


Influenza Type A (RT-PCR) Not Detected  Not Detecte  


 


Influenza Type B (RT-PCR) Not Detected  Not Detecte  


 


SARS-CoV-2 RNA (RT-PCR) Not Detected  Not Detecte  








My Orders





Orders - MARIO BATRES MD


Ua Culture If Indicated (21 22:43)


Covid 19 Inhouse Test (21 22:43)


Influenza A And B By Pcr (21 22:43)


Promethazine Tablet (Phenergan Tablet) (21 01:15)





Vital Signs/I&O











 21





 22:45 01:20


 


Temp 36.7 36.5


 


Pulse 85 73


 


Resp 16 16


 


B/P (MAP) 134/84 (101) 125/68


 


Pulse Ox 97 98


 


O2 Delivery Room Air Room Air





Capillary Refill : Less Than 3 Seconds








Blood Pressure Mean:                    101








Progress Note :  


Progress Note


Because of her URI symptoms through the weekend a swab was collected for Covid 

and influenza.  Both were negative.  She was offered IV hydration and Phenergan.

 She elected to use oral Phenergan and try to aggressively hydrate at home.  See

discharge instructions.





Departure


Impression





   Primary Impression:  


   Migraine headache with aura


   Qualified Codes:  G43.109 - Migraine with aura, not intractable, without 

   status migrainosus


   Additional Impressions:  


   Nausea & vomiting


   Qualified Codes:  R11.2 - Nausea with vomiting, unspecified


   Right sided abdominal pain


   Pregnancy


   Qualified Codes:  Z34.90 - Encounter for supervision of normal pregnancy, 

   unspecified, unspecified trimester


Disposition:  01 HOME, SELF-CARE


Condition:  Improved





Departure-Patient Inst.


Decision time for Depature:  01:14


Referrals:  


LESLI ETIENNE MD (PCP/Family)


Primary Care Physician


Patient Instructions:  Migraines in Adults





Add. Discharge Instructions:  


Drink plenty of clear liquids to stay well-hydrated.  Increase your clear fluid 

intake.


Use Phenergan (promethazine) as prescribed for nausea and vomiting.  Eating 

small quantities of bland food frequently throughout the day helps control 

nausea.


You may use Tylenol (acetaminophen) up to 1000 mg every 6 hours as needed for 

pain or headache.


You have pain on the right side of your abdomen which could be related to 

gallbladder or constipation.  Please discuss this further with Dr. Etienne at your

follow-up appointment.  Avoid fatty or greasy foods which may irritate the 

gallbladder.  If constipation is a problem, you may use stool softener such as 

Colace, increase your dietary fiber, or use MiraLAX (polyethylene glycol) 

purchased over-the-counter.


Call with questions or concerns.


Return to the ER if you have worsening symptoms.








All discharge instructions reviewed with patient and/or family. Voiced 

understanding.


Scripts


Promethazine HCl (Promethazine Tablet) 25 Mg Tablet


25 MG PO Q6H PRN for NAUSEA/VOMITING, #10 TAB


   Prov: MARIO BATRES MD         21





Copy


Copies To 1:   LESLI ETIENNE MD, JOSHUA T MD        Dec 27, 2021 01:17